# Patient Record
Sex: MALE | Race: OTHER | NOT HISPANIC OR LATINO | URBAN - METROPOLITAN AREA
[De-identification: names, ages, dates, MRNs, and addresses within clinical notes are randomized per-mention and may not be internally consistent; named-entity substitution may affect disease eponyms.]

---

## 2017-05-09 ENCOUNTER — EMERGENCY (EMERGENCY)
Facility: HOSPITAL | Age: 32
LOS: 1 days | Discharge: HOME | End: 2017-05-09
Attending: EMERGENCY MEDICINE | Admitting: EMERGENCY MEDICINE
Payer: COMMERCIAL

## 2017-05-09 VITALS
DIASTOLIC BLOOD PRESSURE: 82 MMHG | OXYGEN SATURATION: 100 % | RESPIRATION RATE: 16 BRPM | SYSTOLIC BLOOD PRESSURE: 128 MMHG | TEMPERATURE: 98 F | HEART RATE: 75 BPM

## 2017-05-09 VITALS
RESPIRATION RATE: 16 BRPM | TEMPERATURE: 98 F | DIASTOLIC BLOOD PRESSURE: 84 MMHG | SYSTOLIC BLOOD PRESSURE: 125 MMHG | HEART RATE: 67 BPM | WEIGHT: 130.07 LBS | OXYGEN SATURATION: 100 %

## 2017-05-09 DIAGNOSIS — Z87.891 PERSONAL HISTORY OF NICOTINE DEPENDENCE: ICD-10-CM

## 2017-05-09 DIAGNOSIS — K62.89 OTHER SPECIFIED DISEASES OF ANUS AND RECTUM: ICD-10-CM

## 2017-05-09 DIAGNOSIS — K64.4 RESIDUAL HEMORRHOIDAL SKIN TAGS: ICD-10-CM

## 2017-05-09 PROCEDURE — 99284 EMERGENCY DEPT VISIT MOD MDM: CPT

## 2017-05-09 PROCEDURE — 99284 EMERGENCY DEPT VISIT MOD MDM: CPT | Mod: 25

## 2017-05-09 RX ORDER — LIDOCAINE 4 G/100G
10 CREAM TOPICAL ONCE
Qty: 0 | Refills: 0 | Status: COMPLETED | OUTPATIENT
Start: 2017-05-09 | End: 2017-05-09

## 2017-05-09 RX ADMIN — LIDOCAINE 10 MILLILITER(S): 4 CREAM TOPICAL at 14:25

## 2017-05-09 NOTE — ED PROVIDER NOTE - OBJECTIVE STATEMENT
32yo M hx of hemorrhoids in past, here with complaint of external hemorrhoid x48 hours. Visiting from out of town, wont be able to get back and see his surgeon until next week. Doesn't have any hx of internal. Has had thrombosed ones in past that required excision. Feels like it got much worse today and worried it will be thrombosed tomorrow. No other symptoms. No rectal bleeding.

## 2017-05-09 NOTE — CONSULT NOTE ADULT - SUBJECTIVE AND OBJECTIVE BOX
Attending:  Dr. Clemente    HPI: 32 yo M with h/o external hemorrhoids presents to ED with 48 hrs of pain from an external hemorrhoid. He has tried   Otherwise asymptomatic. Normal bowel movements.       PAST MEDICAL & SURGICAL HISTORY:  Hemorrhoids, external      MEDICATIONS  (STANDING):    MEDICATIONS  (PRN):      Allergies    No Known Allergies    Intolerances        SOCIAL HISTORY:    FAMILY HISTORY:      REVIEW OF SYSTEMS      General:	    Skin/Breast:  	  Ophthalmologic:  	  ENMT:	    Respiratory and Thorax:  	  Cardiovascular:	    Gastrointestinal:	    Genitourinary:	    Musculoskeletal:	    Neurological:	    Psychiatric:	    Hematology/Lymphatics:	    Endocrine:	    Allergic/Immunologic:	    Vital Signs Last 24 Hrs  T(C): 36.8, Max: 36.8 (05-09 @ 13:29)  T(F): 98.3, Max: 98.3 (05-09 @ 13:29)  HR: 67 (67 - 67)  BP: 125/84 (125/84 - 125/84)  BP(mean): --  RR: 16 (16 - 16)  SpO2: 100% (100% - 100%)    I&O's Summary      Physical Exam:  General: NAD, resting comfortably  HEENT: NC/AT, EOMI, normal hearing, no oral lesions, no LAD, neck supple  Pulmonary: normal resp effort, CTA-B  Cardiovascular: NSR, no murmurs  Abdominal: soft, ND/NT, no organomegaly  Extremities: WWP, normal strength, no clubbing/cyanosis/edema  Neuro: A/O x 3, CNs II-XII grossly intact, normal sensation, no focal deficits  Pulses: palpable distal pulses    Lines/drains/tubes:    LABS:              CAPILLARY BLOOD GLUCOSE        Cultures:      RADIOLOGY & ADDITIONAL STUDIES:      Assessment: 31y Male    Recommendations:  -   - discussed with Chief on call  - call /[id number] with questions Attending:  Dr. Clemente    HPI: 30 yo M with h/o external hemorrhoids presents to ED with 48 hrs of pain from an external hemorrhoid. He has tried sitz baths, but still painful. Presents for lancing of anal pile.   Otherwise asymptomatic. Normal bowel movements. No fevers/chills, no SOA, no CP, no nausea, no abd pain.       PAST MEDICAL & SURGICAL HISTORY:  Hemorrhoids, external  No PSH      MEDICATIONS  (STANDING): no home meds      Allergies    No Known Allergies            SOCIAL HISTORY: tobacco abuse, social EtOH, no ID    FAMILY HISTORY: non-contributory     	    Vital Signs Last 24 Hrs  T(C): 36.8, Max: 36.8 (05-09 @ 13:29)  T(F): 98.3, Max: 98.3 (05-09 @ 13:29)  HR: 67 (67 - 67)  BP: 125/84 (125/84 - 125/84)  BP(mean): --  RR: 16 (16 - 16)  SpO2: 100% (100% - 100%)    I&O's Summary      Physical Exam:  General: mild distress  Pulmonary: normal resp effort  Cardiovascular: NSR  Abdominal: soft, ND/NT  Rectal: left lateral anal pile, soft on superior aspect, partially thrombosed at inferior aspect, TTP  Extremities: WWP, normal strength  Neuro: A/O x 3, no focal deficits          Assessment: 31y Male with anal pile, presents for lancing.    Recommendations:  - Anal pile lanced at bedside (see procedure note)  - pain control as needed  - stool softeners daily  - sitz baths QID  - seen with Chief on call  - follow up if needed with Dr. Becker in office Attending:  Dr. Clemente    HPI: 32 yo M with h/o external hemorrhoids presents to ED with 48 hrs of pain from an external hemorrhoid. He has tried sitz baths, but still painful. Presents for lancing of anal pile.   Otherwise asymptomatic. Normal bowel movements. No fevers/chills, no SOA, no CP, no nausea, no abd pain.       PAST MEDICAL & SURGICAL HISTORY:  Hemorrhoids, external  No PSH      MEDICATIONS  (STANDING): no home meds      Allergies    No Known Allergies            SOCIAL HISTORY: tobacco abuse, social EtOH, no ID    FAMILY HISTORY: non-contributory     	    Vital Signs Last 24 Hrs  T(C): 36.8, Max: 36.8 (05-09 @ 13:29)  T(F): 98.3, Max: 98.3 (05-09 @ 13:29)  HR: 67 (67 - 67)  BP: 125/84 (125/84 - 125/84)  BP(mean): --  RR: 16 (16 - 16)  SpO2: 100% (100% - 100%)    I&O's Summary      Physical Exam:  General: mild distress  Pulmonary: normal resp effort  Cardiovascular: NSR  Abdominal: soft, ND/NT  Rectal: left lateral anal pile, soft on superior aspect, partially thrombosed at inferior aspect, TTP  Extremities: WWP, normal strength  Neuro: A/O x 3, no focal deficits

## 2017-05-09 NOTE — ED ADULT NURSE NOTE - OBJECTIVE STATEMENT
Pt c/o rectal pain. Stated "I have hemorrhoids. I was trying to threat it conservatively with sits baths and witch hazel but it does not help." Pt c/o external hemorrhoids. Denies rectal bleeding, constipation, N/V.

## 2017-05-09 NOTE — ED ADULT TRIAGE NOTE - CHIEF COMPLAINT QUOTE
Patient c/o of rectal pain x2days, denies fever.  States he has had hemorroids before, but never this painful.

## 2017-05-09 NOTE — CONSULT NOTE ADULT - ASSESSMENT
Assessment: 31y Male with anal pile, presents for lancing.    Recommendations:  - Anal pile lanced at bedside (see procedure note), packing/dressing applied  - pain control as needed  - stool softeners daily  - sitz baths QID  - seen with Chief on call  - follow up if needed with Dr. Becker in office, [phone]. Doesn't require follow-up appointment in office if returning home to [place] soon to see surgeon there.

## 2017-05-09 NOTE — ED PROVIDER NOTE - MEDICAL DECISION MAKING DETAILS
here with external hemmorhoid, worsening despite treatment with sitz baths and witch hazel. surgery consulted to evaluate.

## 2017-05-09 NOTE — PROCEDURE NOTE - ADDITIONAL PROCEDURE DETAILS
Left lateral anal pile identified on physical exam. Left lateral anal pile identified on physical exam. Prepped supplies and donned sterile gloves. 2% lidocaine with epinephrine, 10 cc, injected around base and into anal pile. Pile lanced with 11 blade. Minimal blood oozed from incision. Small thrombus removed. Wound packed with gauze and covered with dry gauze, sealed with tape.

## 2018-05-08 NOTE — PROCEDURE NOTE - NSANATOMICLLOCATION_GEN_A_CORE
Was A Bandage Applied: Yes Hemostasis: Aluminum Chloride Detail Level: Detailed Medical Necessity Clause: This procedure was medically necessary because the lesion that was treated was: Medical Necessity Information: It is in your best interest to select a reason for this procedure from the list below. All of these items fulfill various CMS LCD requirements except the new and changing color options. Path Notes (To The Dermatopathologist): Please check margins. Anesthesia Type: 1% lidocaine with 1:100,000 epinephrine and a 1:6 solution of 8.4% sodium bicarbonate anus, left lateral Consent was obtained from the patient. The risks and benefits to therapy were discussed in detail. Specifically, the risks of infection, scarring, bleeding, prolonged wound healing, incomplete removal, allergy to anesthesia, nerve injury and recurrence were addressed. Prior to the procedure, the treatment site was clearly identified and confirmed by the patient. All components of Universal Protocol/PAUSE Rule completed. Bill 22370 For Specimen Handling/Conveyance To Laboratory?: no Wound Care: Aquaphor Size Of Lesion In Cm (Required): 0.3 Size Of Margin In Cm (Margins Are Not Added To Billing Dimensions): 0.1 Notification Instructions: Pt understands to return to office in 2-4 weeks for biopsy results Billing Type: Third-Party Bill Post-Care Instructions: I reviewed with the patient in detail post-care instructions. Patient is to keep the biopsy site dry overnight, and then apply aquaphor twice daily until healed. Patient may apply hydrogen peroxide soaks to remove any crusting. X Size Of Lesion In Cm (Optional): 0 Anesthesia Volume In Cc: 0.5
